# Patient Record
Sex: MALE
[De-identification: names, ages, dates, MRNs, and addresses within clinical notes are randomized per-mention and may not be internally consistent; named-entity substitution may affect disease eponyms.]

---

## 2023-08-24 ENCOUNTER — NURSE TRIAGE (OUTPATIENT)
Dept: OTHER | Facility: CLINIC | Age: 53
End: 2023-08-24

## 2023-08-24 NOTE — TELEPHONE ENCOUNTER
Location of patient: [address] [facility] [address] call from [place] at [facility] with [employer]. . Wanting to establish at [facility] practice. Subjective: Caller states \"bilateral shoulder pain\"     Current Symptoms: intermittent right/left shoulder pain, it goes from shoulder blade to arm. Lasts 10-15 seconds, and unable to lift arms as much as normal.  Occurs mostly when gets upset. The time he is able to use his arms is getting shorter, like holding a wrench. Numbness/tingling in hands and feet, but not a new symptom. Denies chest pain, SOB, injury, swelling    Onset: 6 months ago; intermittent, waxing and waning    Associated Symptoms: NA    Pain Severity: 0-4/10; sharp; intermittent    Temperature: none     What has been tried: nothing    LMP: NA Pregnant: NA    Recommended disposition: See in Office Today    [facility] if no appts. Care advice provided, patient verbalizes understanding; denies any other questions or concerns; instructed to call back for any new or worsening symptoms. Patient/Caller agrees with recommended disposition; writer provided warm transfer to [facility] at [facility] for appointment scheduling    Attention Provider: Thank you for allowing me to participate in the care of your patient. The patient was connected to triage in response to information provided to the ECC/PSC. Please do not respond through this encounter as the response is not directed to a shared pool.           Reason for Disposition   Weakness (i.e., loss of strength) in hand or fingers  (Exception: Not truly weak; hand feels weak because of pain.)    Protocols used: Shoulder Pain-ADULT-[place]

## 2023-10-31 ENCOUNTER — TELEPHONE (OUTPATIENT)
Age: 53
End: 2023-10-31

## 2023-10-31 NOTE — TELEPHONE ENCOUNTER
Called pt and lvm. The provider will be out of the office the entire November.  Provided find provider number [phone]